# Patient Record
Sex: FEMALE | Race: WHITE | NOT HISPANIC OR LATINO | URBAN - METROPOLITAN AREA
[De-identification: names, ages, dates, MRNs, and addresses within clinical notes are randomized per-mention and may not be internally consistent; named-entity substitution may affect disease eponyms.]

---

## 2022-03-11 ENCOUNTER — TELEPHONE ENCOUNTER (OUTPATIENT)
Dept: URBAN - METROPOLITAN AREA CLINIC 68 | Facility: CLINIC | Age: 62
End: 2022-03-11

## 2022-04-13 ENCOUNTER — OFFICE VISIT (OUTPATIENT)
Dept: URBAN - METROPOLITAN AREA CLINIC 68 | Facility: CLINIC | Age: 62
End: 2022-04-13

## 2022-05-23 ENCOUNTER — OFFICE VISIT (OUTPATIENT)
Dept: URBAN - METROPOLITAN AREA SURGERY CENTER 12 | Facility: SURGERY CENTER | Age: 62
End: 2022-05-23

## 2022-06-04 ENCOUNTER — TELEPHONE ENCOUNTER (OUTPATIENT)
Dept: URBAN - METROPOLITAN AREA CLINIC 68 | Facility: CLINIC | Age: 62
End: 2022-06-04

## 2022-06-05 ENCOUNTER — TELEPHONE ENCOUNTER (OUTPATIENT)
Dept: URBAN - METROPOLITAN AREA CLINIC 68 | Facility: CLINIC | Age: 62
End: 2022-06-05

## 2022-06-05 RX ORDER — ALBUTEROL SULFATE 90 UG/1
PROAIR HFA( 108 (90 BASE)MCG/ACT INHALATION  AS NEEDED ) ACTIVE -HX ENTRY AEROSOL, METERED RESPIRATORY (INHALATION) AS NEEDED
Status: ACTIVE | COMMUNITY
Start: 2019-05-07

## 2022-06-05 RX ORDER — FLUOXETINE HYDROCHLORIDE 10 MG/1
PROZAC( 10MG ORAL  DAILY ) ACTIVE -HX ENTRY CAPSULE ORAL DAILY
Status: ACTIVE | COMMUNITY
Start: 2016-09-07

## 2022-06-05 RX ORDER — IBUPROFEN 200 MG/1
ADVIL( 200MG ORAL 2 AS NEEDED ) ACTIVE -HX ENTRY TABLET, COATED ORAL AS NEEDED
Status: ACTIVE | COMMUNITY
Start: 2019-05-07

## 2022-06-05 RX ORDER — IBUPROFEN 200 MG/1
ADVIL( 200MG ORAL  AS NEEDED ) ACTIVE -HX ENTRY TABLET, COATED ORAL AS NEEDED
Status: ACTIVE | COMMUNITY
Start: 2016-09-07

## 2022-06-05 RX ORDER — FOLIC ACID 1 MG/1
FOLIC ACID( 1MG ORAL 1 DAILY ) ACTIVE -HX ENTRY TABLET ORAL DAILY
Status: ACTIVE | COMMUNITY
Start: 2019-05-07

## 2022-06-25 ENCOUNTER — TELEPHONE ENCOUNTER (OUTPATIENT)
Age: 62
End: 2022-06-25

## 2022-06-25 RX ORDER — FOLIC ACID 1 MG/1
FOLIC ACID( 1MG ORAL 1 DAILY ) INACTIVE -HX ENTRY TABLET ORAL DAILY
OUTPATIENT
Start: 2022-04-13

## 2022-06-25 RX ORDER — FLUOXETINE HCL 10 MG
PROZAC( 10MG ORAL  DAILY ) INACTIVE -HX ENTRY CAPSULE ORAL DAILY
OUTPATIENT
Start: 2022-04-13

## 2022-06-25 RX ORDER — IBUPROFEN 200 MG/1
ADVIL( 200MG ORAL  AS NEEDED ) INACTIVE -HX ENTRY TABLET, COATED ORAL AS NEEDED
OUTPATIENT
Start: 2022-04-13

## 2022-06-25 RX ORDER — ASPIRIN 81 MG/1
LOW-DOSE ASPIRIN( 81MG ORAL 1 DAILY ) INACTIVE -HX ENTRY TABLET, COATED ORAL DAILY
OUTPATIENT
Start: 2022-04-13

## 2022-06-25 RX ORDER — FLUTICASONE PROPIONATE 50 UG/1
FLONASE( 50MCG/ACT NASAL  AS NEEDED ) INACTIVE -HX ENTRY SPRAY, METERED NASAL AS NEEDED
OUTPATIENT
Start: 2022-04-13

## 2022-06-25 RX ORDER — SODIUM SULFATE, MAGNESIUM SULFATE, AND POTASSIUM CHLORIDE 17.75; 2.7; 2.25 G/1; G/1; G/1
TABLET ORAL AS DIRECTED
Qty: 24 | Refills: 0 | OUTPATIENT
Start: 2022-04-13 | End: 2022-04-14

## 2022-06-26 ENCOUNTER — TELEPHONE ENCOUNTER (OUTPATIENT)
Age: 62
End: 2022-06-26

## 2022-06-26 RX ORDER — ASPIRIN 325 MG
BAYER LOW DOSE( 81MG ORAL 1 DAILY ) ACTIVE -HX ENTRY TABLET ORAL DAILY
Status: ACTIVE | COMMUNITY
Start: 2022-04-13

## 2022-06-26 RX ORDER — PHENTERMINE HYDROCHLORIDE 37.5 MG/1
PHENTERMINE HCL( 37.5MG ORAL 1 DAILY ) ACTIVE -HX ENTRY CAPSULE ORAL DAILY
Status: ACTIVE | COMMUNITY
Start: 2022-04-13

## 2022-06-26 RX ORDER — IBUPROFEN 200 MG/1
ADVIL( 200MG ORAL 2 AS NEEDED ) ACTIVE -HX ENTRY TABLET, COATED ORAL AS NEEDED
Status: ACTIVE | COMMUNITY
Start: 2019-05-07

## 2022-06-26 RX ORDER — CETIRIZINE HYDROCHLORIDE 10 MG/1
ZYRTEC ALLERGY( 10MG ORAL 1 DAILY ) ACTIVE -HX ENTRY TABLET, FILM COATED ORAL DAILY
Status: ACTIVE | COMMUNITY
Start: 2022-04-13

## 2022-11-15 ENCOUNTER — DASHBOARD ENCOUNTERS (OUTPATIENT)
Age: 62
End: 2022-11-15

## 2022-11-15 ENCOUNTER — OFFICE VISIT (OUTPATIENT)
Dept: URBAN - METROPOLITAN AREA CLINIC 68 | Facility: CLINIC | Age: 62
End: 2022-11-15

## 2022-11-15 RX ORDER — IBUPROFEN 200 MG/1
ADVIL( 200MG ORAL 2 AS NEEDED ) ACTIVE -HX ENTRY TABLET, COATED ORAL AS NEEDED
Status: ACTIVE | COMMUNITY
Start: 2019-05-07

## 2022-11-15 RX ORDER — FLUOXETINE HYDROCHLORIDE 10 MG/1
PROZAC( 10MG ORAL  DAILY ) ACTIVE -HX ENTRY CAPSULE ORAL DAILY
Status: ACTIVE | COMMUNITY
Start: 2016-09-07

## 2022-11-15 RX ORDER — ALBUTEROL SULFATE 90 UG/1
PROAIR HFA( 108 (90 BASE)MCG/ACT INHALATION  AS NEEDED ) ACTIVE -HX ENTRY AEROSOL, METERED RESPIRATORY (INHALATION) AS NEEDED
Status: ACTIVE | COMMUNITY
Start: 2019-05-07

## 2022-11-15 RX ORDER — FOLIC ACID 1 MG/1
FOLIC ACID( 1MG ORAL 1 DAILY ) ACTIVE -HX ENTRY TABLET ORAL DAILY
Status: ACTIVE | COMMUNITY
Start: 2019-05-07

## 2022-11-15 NOTE — HPI-MIGRATED HPI
Transition to Care : 62 y.o. WF with diverticulosis and acute sigmoid diverticulitis for which she was seen at [facility] on 10/18/2022. She is s/p a colonoscopy in 5/2022 which showed diverticulosis and IH. This is here first episode of diverticulitis and she was treated with Levaquin and Flagyl. She is doing well and denies any abdominal pain, no n/v. Her [facility] inpatient labs and imaging were reviewed.

## 2024-07-11 ENCOUNTER — TELEPHONE ENCOUNTER (OUTPATIENT)
Dept: URBAN - METROPOLITAN AREA CLINIC 68 | Facility: CLINIC | Age: 64
End: 2024-07-11

## 2025-04-03 ENCOUNTER — OFFICE VISIT (OUTPATIENT)
Dept: URBAN - METROPOLITAN AREA CLINIC 68 | Facility: CLINIC | Age: 65
End: 2025-04-03
Payer: COMMERCIAL

## 2025-04-03 ENCOUNTER — LAB OUTSIDE AN ENCOUNTER (OUTPATIENT)
Dept: URBAN - METROPOLITAN AREA CLINIC 68 | Facility: CLINIC | Age: 65
End: 2025-04-03

## 2025-04-03 DIAGNOSIS — K57.32 SIGMOID DIVERTICULITIS: ICD-10-CM

## 2025-04-03 PROCEDURE — 99214 OFFICE O/P EST MOD 30 MIN: CPT

## 2025-04-03 RX ORDER — ALBUTEROL SULFATE 90 UG/1
PROAIR HFA( 108 (90 BASE)MCG/ACT INHALATION  AS NEEDED ) ACTIVE -HX ENTRY AEROSOL, METERED RESPIRATORY (INHALATION) AS NEEDED
Status: ON HOLD | COMMUNITY
Start: 2019-05-07

## 2025-04-03 RX ORDER — FLUOXETINE HYDROCHLORIDE 10 MG/1
PROZAC( 10MG ORAL  DAILY ) ACTIVE -HX ENTRY CAPSULE ORAL DAILY
Status: ON HOLD | COMMUNITY
Start: 2016-09-07

## 2025-04-03 RX ORDER — IBUPROFEN 200 MG/1
ADVIL( 200MG ORAL 2 AS NEEDED ) ACTIVE -HX ENTRY TABLET, COATED ORAL AS NEEDED
Status: ACTIVE | COMMUNITY
Start: 2019-05-07

## 2025-04-03 RX ORDER — SOD SULF/POT CHLORIDE/MAG SULF 1.479 G
12 TABLETS THE FIRST DOSE THE EVENING BEFORE AND SECOND DOSE THE MORNING OF COLONOSCOPY TABLET ORAL TWICE A DAY
Qty: 24 | OUTPATIENT
Start: 2025-04-03 | End: 2025-04-04

## 2025-04-03 RX ORDER — FOLIC ACID 1 MG/1
FOLIC ACID( 1MG ORAL 1 DAILY ) ACTIVE -HX ENTRY TABLET ORAL DAILY
Status: ON HOLD | COMMUNITY
Start: 2019-05-07

## 2025-04-03 NOTE — HPI-TODAY'S VISIT:
62 y.o. WF who presented to [facility] 3/25/25 with abdominal pain and change in bowel habits and was admitted due to acute sigmoid diverticulitis with focal contained microperforation within the mesentery. She was treated with zosyn iv empirically and bowel rest and symptoms slowly improved with a f/u CT showing improvement in the abscess and was therefore discharged on 3/31/25. She presents today for follow-up and states abdominal pain has resolved however she has not had a formed BM since discharge. She is recommended to start daily Miralax. Physical exam revealed some tenderness to the entire abdomen and she is recommended to complete abx (2 days of PO Augmentin left) and f/u in one week. She continues on low residue diet at this time. She did have a first episode of acute sigmoid diverticulitis for which she was seen at [facility] on 10/18/2022. She is s/p a colonoscopy in 5/2022 which showed diverticulosis and IH.  Her [facility] inpatient labs and imaging were reviewed.

## 2025-04-11 ENCOUNTER — OFFICE VISIT (OUTPATIENT)
Dept: URBAN - METROPOLITAN AREA CLINIC 68 | Facility: CLINIC | Age: 65
End: 2025-04-11

## 2025-04-11 NOTE — HPI-TODAY'S VISIT:
62 y.o. WF who presented to [facility] 3/25/25 with abdominal pain and change in bowel habits and was admitted due to acute sigmoid diverticulitis with focal contained microperforation within the mesentery. She was treated with zosyn iv empirically and bowel rest and symptoms slowly improved with a f/u CT showing improvement in the abscess and was therefore discharged on 3/31/25. She presents today for follow-up and states abdominal pain has resolved however she has not had a formed BM since discharge. She is recommended to start daily Miralax. Physical exam revealed some tenderness to the entire abdomen and she is recommended to complete abx (2 days of PO Augmentin left) and f/u in one week. She continues on low residue diet at this time. She did have a first episode of acute sigmoid diverticulitis for which she was seen at [facility] on 10/18/2022. She is s/p a colonoscopy in 5/2022 which showed diverticulosis and IH.  Her [facility] inpatient labs and imaging were reviewed.

## 2025-04-25 ENCOUNTER — TELEPHONE ENCOUNTER (OUTPATIENT)
Dept: URBAN - METROPOLITAN AREA CLINIC 68 | Facility: CLINIC | Age: 65
End: 2025-04-25

## 2025-05-09 ENCOUNTER — OFFICE VISIT (OUTPATIENT)
Dept: URBAN - METROPOLITAN AREA SURGERY CENTER 12 | Facility: SURGERY CENTER | Age: 65
End: 2025-05-09
Payer: COMMERCIAL

## 2025-05-09 ENCOUNTER — CLAIMS CREATED FROM THE CLAIM WINDOW (OUTPATIENT)
Dept: URBAN - METROPOLITAN AREA CLINIC 4 | Facility: CLINIC | Age: 65
End: 2025-05-09
Payer: COMMERCIAL

## 2025-05-09 DIAGNOSIS — K63.5 COLON POLYP: ICD-10-CM

## 2025-05-09 DIAGNOSIS — K63.5 POLYP OF COLON: ICD-10-CM

## 2025-05-09 DIAGNOSIS — K57.30 DIVERTICULOSIS OF LARGE INTESTINE WITHOUT PERFORATION OR ABSCESS WITHOUT BLEEDING: ICD-10-CM

## 2025-05-09 DIAGNOSIS — F41.9 ANXIETY DISORDER, UNSPECIFIED TYPE: ICD-10-CM

## 2025-05-09 DIAGNOSIS — K64.0 FIRST DEGREE HEMORRHOIDS: ICD-10-CM

## 2025-05-09 DIAGNOSIS — K63.5 HYPERPLASTIC POLYP OF DESCENDING COLON: ICD-10-CM

## 2025-05-09 PROCEDURE — 88305 TISSUE EXAM BY PATHOLOGIST: CPT | Performed by: PATHOLOGY

## 2025-05-09 PROCEDURE — 45385 COLONOSCOPY W/LESION REMOVAL: CPT | Performed by: INTERNAL MEDICINE

## 2025-05-09 PROCEDURE — 00811 ANES LWR INTST NDSC NOS: CPT | Performed by: NURSE ANESTHETIST, CERTIFIED REGISTERED

## 2025-05-09 RX ORDER — IBUPROFEN 200 MG/1
ADVIL( 200MG ORAL 2 AS NEEDED ) ACTIVE -HX ENTRY TABLET, COATED ORAL AS NEEDED
Status: ACTIVE | COMMUNITY
Start: 2019-05-07

## 2025-05-09 RX ORDER — ALBUTEROL SULFATE 90 UG/1
PROAIR HFA( 108 (90 BASE)MCG/ACT INHALATION  AS NEEDED ) ACTIVE -HX ENTRY AEROSOL, METERED RESPIRATORY (INHALATION) AS NEEDED
Status: ON HOLD | COMMUNITY
Start: 2019-05-07

## 2025-05-09 RX ORDER — FOLIC ACID 1 MG/1
FOLIC ACID( 1MG ORAL 1 DAILY ) ACTIVE -HX ENTRY TABLET ORAL DAILY
Status: ON HOLD | COMMUNITY
Start: 2019-05-07

## 2025-05-09 RX ORDER — FLUOXETINE HYDROCHLORIDE 10 MG/1
PROZAC( 10MG ORAL  DAILY ) ACTIVE -HX ENTRY CAPSULE ORAL DAILY
Status: ON HOLD | COMMUNITY
Start: 2016-09-07

## 2025-05-12 ENCOUNTER — OFFICE VISIT (OUTPATIENT)
Dept: URBAN - METROPOLITAN AREA SURGERY CENTER 12 | Facility: SURGERY CENTER | Age: 65
End: 2025-05-12